# Patient Record
Sex: MALE | Race: OTHER | HISPANIC OR LATINO | URBAN - METROPOLITAN AREA
[De-identification: names, ages, dates, MRNs, and addresses within clinical notes are randomized per-mention and may not be internally consistent; named-entity substitution may affect disease eponyms.]

---

## 2017-11-03 ENCOUNTER — EMERGENCY (EMERGENCY)
Facility: HOSPITAL | Age: 47
LOS: 1 days | Discharge: HOME | End: 2017-11-03
Attending: EMERGENCY MEDICINE
Payer: COMMERCIAL

## 2017-11-03 VITALS
OXYGEN SATURATION: 100 % | RESPIRATION RATE: 18 BRPM | DIASTOLIC BLOOD PRESSURE: 60 MMHG | HEART RATE: 60 BPM | SYSTOLIC BLOOD PRESSURE: 106 MMHG | TEMPERATURE: 99 F

## 2017-11-03 PROCEDURE — 99284 EMERGENCY DEPT VISIT MOD MDM: CPT

## 2017-11-03 PROCEDURE — 73620 X-RAY EXAM OF FOOT: CPT | Mod: 26,RT

## 2017-11-03 PROCEDURE — 90471 IMMUNIZATION ADMIN: CPT

## 2017-11-03 PROCEDURE — 99283 EMERGENCY DEPT VISIT LOW MDM: CPT | Mod: 25

## 2017-11-03 PROCEDURE — 90715 TDAP VACCINE 7 YRS/> IM: CPT

## 2017-11-03 PROCEDURE — 73620 X-RAY EXAM OF FOOT: CPT

## 2017-11-03 RX ORDER — TETANUS TOXOID, REDUCED DIPHTHERIA TOXOID AND ACELLULAR PERTUSSIS VACCINE, ADSORBED 5; 2.5; 8; 8; 2.5 [IU]/.5ML; [IU]/.5ML; UG/.5ML; UG/.5ML; UG/.5ML
0.5 SUSPENSION INTRAMUSCULAR ONCE
Qty: 0 | Refills: 0 | Status: COMPLETED | OUTPATIENT
Start: 2017-11-03 | End: 2017-11-03

## 2017-11-03 RX ORDER — CIPROFLOXACIN LACTATE 400MG/40ML
500 VIAL (ML) INTRAVENOUS ONCE
Qty: 0 | Refills: 0 | Status: COMPLETED | OUTPATIENT
Start: 2017-11-03 | End: 2017-11-03

## 2017-11-03 RX ORDER — MOXIFLOXACIN HYDROCHLORIDE TABLETS, 400 MG 400 MG/1
1 TABLET, FILM COATED ORAL
Qty: 14 | Refills: 0 | OUTPATIENT
Start: 2017-11-03 | End: 2017-11-10

## 2017-11-03 RX ADMIN — TETANUS TOXOID, REDUCED DIPHTHERIA TOXOID AND ACELLULAR PERTUSSIS VACCINE, ADSORBED 0.5 MILLILITER(S): 5; 2.5; 8; 8; 2.5 SUSPENSION INTRAMUSCULAR at 12:31

## 2017-11-03 RX ADMIN — Medication 500 MILLIGRAM(S): at 12:31

## 2017-11-03 NOTE — ED PROVIDER NOTE - MEDICAL DECISION MAKING DETAILS
46 y/o M pt, well appearing, tetanus not UTD; will give tetanus. No signs of infection; discussed signs of infection with pt. Will give Ibuprofen and D/C with podiatry f/u as needed. 46 y/o M pt, well appearing, tetanus not UTD; will give tetanus. No signs of infection; discussed signs of infection with pt. Will give Ibuprofen, cipro for pseudomonas coverage d/t rubber soled shoes, xray r/o low suspicion FB and D/C with podiatry f/u as needed. 48 y/o M pt, well appearing, tetanus not UTD; will give tetanus. No signs of infection; discussed signs of infection with pt. Will give Ibuprofen, cipro for pseudomonas coverage d/t rubber soled shoes, discussed , xray r/o low suspicion FB and D/C with podiatry f/u as needed.

## 2017-11-03 NOTE — ED PROVIDER NOTE - OBJECTIVE STATEMENT
48 y/o M pt with no significant PMHx and no significant PSHx presents to the ED c/o R foot pain s/p pricking foot with nail while at work yesterday. Pt notes that he was wearing shoes, but the nail went through his shoe. Pt states he does not feel like anything is inside his foot, but notes pain when walking. Pt denies fever, chills, discharge, redness or any other complaints. NKDA. 48 y/o M pt with no significant PMHx and no significant PSHx presents to the ED c/o R foot pain s/p pricking foot with nail while at work yesterday. Pt notes that he was wearing shoes, but the nail went through his rubber soled shoe. Pt states he does not feel like anything is inside his foot, but notes pain when walking. Pt denies fever, chills, discharge, redness or any other complaints. NKDA.

## 2017-11-03 NOTE — ED ADULT NURSE NOTE - OBJECTIVE STATEMENT
Pt AOx3, ambulatory, c/o right foot injury after stepping on a nail yesterday. No active bleeding noted at site, no redness, no swelling noted.

## 2017-11-03 NOTE — ED PROVIDER NOTE - MUSCULOSKELETAL, MLM
Spine appears normal, range of motion is not limited, no muscle or joint tenderness. No tendon involvement to R foot injury.

## 2018-12-08 NOTE — ED ADULT NURSE NOTE - TEMPLATE LIST FOR HEAD TO TOE ASSESSMENT
Telephone Encounter by Brinda Dunaway, RN, BSN at 05/26/17 08:37 AM     Author:  Brinda Dunaway, RN, BSN Service:  (none) Author Type:  Registered Nurse     Filed:  05/26/17 08:38 AM Encounter Date:  5/25/2017 Status:  Signed     :  Brinda Dunaway, RN, BSN (Registered Nurse)            Fwd to Reception- Pls offer [facility] apt for 6/14 at 4:30 with Dr. Guerra  If pt can not come at this time then pls place on wait list[CD1.1M]       Revision History        User Key Date/Time User Provider Type Action    > CD1.1 05/26/17 08:38 AM Brinda Dunaway, RN, BSN Registered Nurse Sign    M - Manual             Wounds